# Patient Record
Sex: MALE | Race: WHITE | NOT HISPANIC OR LATINO | URBAN - METROPOLITAN AREA
[De-identification: names, ages, dates, MRNs, and addresses within clinical notes are randomized per-mention and may not be internally consistent; named-entity substitution may affect disease eponyms.]

---

## 2024-06-24 PROBLEM — F81.0 SPECIFIC LEARNING DISORDER WITH READING IMPAIRMENT: Status: ACTIVE | Noted: 2024-06-24

## 2024-06-24 PROBLEM — F43.21 PERSISTENT ADJUSTMENT DISORDER WITH DEPRESSED MOOD: Status: ACTIVE | Noted: 2024-06-24

## 2024-06-24 PROBLEM — L30.9 ECZEMA: Status: ACTIVE | Noted: 2024-06-24

## 2024-06-24 PROBLEM — L85.8 KERATOSIS PILARIS: Status: ACTIVE | Noted: 2024-06-24

## 2024-06-24 PROBLEM — F90.0 ADHD, PREDOMINANTLY INATTENTIVE TYPE: Status: ACTIVE | Noted: 2024-06-24

## 2024-06-24 PROBLEM — Z97.3 WEARS GLASSES: Status: ACTIVE | Noted: 2024-06-24

## 2024-06-24 PROBLEM — M94.0 COSTOCHONDRITIS: Status: RESOLVED | Noted: 2024-06-24 | Resolved: 2024-06-24

## 2024-06-24 PROBLEM — D22.9 BENIGN NEVUS: Status: ACTIVE | Noted: 2024-06-24

## 2024-06-25 ENCOUNTER — APPOINTMENT (OUTPATIENT)
Dept: PEDIATRICS | Facility: CLINIC | Age: 19
End: 2024-06-25
Payer: COMMERCIAL

## 2024-06-25 VITALS
SYSTOLIC BLOOD PRESSURE: 119 MMHG | BODY MASS INDEX: 31.72 KG/M2 | HEART RATE: 82 BPM | DIASTOLIC BLOOD PRESSURE: 81 MMHG | WEIGHT: 209.3 LBS | HEIGHT: 68 IN

## 2024-06-25 DIAGNOSIS — F90.0 ADHD, PREDOMINANTLY INATTENTIVE TYPE: ICD-10-CM

## 2024-06-25 DIAGNOSIS — F41.1 GENERALIZED ANXIETY DISORDER: ICD-10-CM

## 2024-06-25 DIAGNOSIS — Z00.01 ENCOUNTER FOR GENERAL ADULT MEDICAL EXAMINATION WITH ABNORMAL FINDINGS: Primary | ICD-10-CM

## 2024-06-25 PROBLEM — L30.9 ECZEMA: Status: RESOLVED | Noted: 2024-06-24 | Resolved: 2024-06-25

## 2024-06-25 PROCEDURE — 4004F PT TOBACCO SCREEN RCVD TLK: CPT | Performed by: PEDIATRICS

## 2024-06-25 PROCEDURE — 99395 PREV VISIT EST AGE 18-39: CPT | Performed by: PEDIATRICS

## 2024-06-25 PROCEDURE — 96127 BRIEF EMOTIONAL/BEHAV ASSMT: CPT | Performed by: PEDIATRICS

## 2024-06-25 PROCEDURE — 3008F BODY MASS INDEX DOCD: CPT | Performed by: PEDIATRICS

## 2024-06-25 RX ORDER — ESCITALOPRAM OXALATE 10 MG/1
TABLET ORAL
Qty: 30 TABLET | Refills: 0 | Status: SHIPPED | OUTPATIENT
Start: 2024-06-25

## 2024-06-25 ASSESSMENT — PATIENT HEALTH QUESTIONNAIRE - PHQ9
3. TROUBLE FALLING OR STAYING ASLEEP: NEARLY EVERY DAY
3. TROUBLE FALLING OR STAYING ASLEEP OR SLEEPING TOO MUCH: NEARLY EVERY DAY
SUM OF ALL RESPONSES TO PHQ QUESTIONS 1-9: 12
7. TROUBLE CONCENTRATING ON THINGS, SUCH AS READING THE NEWSPAPER OR WATCHING TELEVISION: NEARLY EVERY DAY
4. FEELING TIRED OR HAVING LITTLE ENERGY: SEVERAL DAYS
10. IF YOU CHECKED OFF ANY PROBLEMS, HOW DIFFICULT HAVE THESE PROBLEMS MADE IT FOR YOU TO DO YOUR WORK, TAKE CARE OF THINGS AT HOME, OR GET ALONG WITH OTHER PEOPLE: SOMEWHAT DIFFICULT
SUM OF ALL RESPONSES TO PHQ9 QUESTIONS 1 & 2: 0
9. THOUGHTS THAT YOU WOULD BE BETTER OFF DEAD, OR OF HURTING YOURSELF: NOT AT ALL
2. FEELING DOWN, DEPRESSED OR HOPELESS: NOT AT ALL
9. THOUGHTS THAT YOU WOULD BE BETTER OFF DEAD, OR OF HURTING YOURSELF: NOT AT ALL
1. LITTLE INTEREST OR PLEASURE IN DOING THINGS: NOT AT ALL
1. LITTLE INTEREST OR PLEASURE IN DOING THINGS: NOT AT ALL
7. TROUBLE CONCENTRATING ON THINGS, SUCH AS READING THE NEWSPAPER OR WATCHING TELEVISION: NEARLY EVERY DAY
8. MOVING OR SPEAKING SO SLOWLY THAT OTHER PEOPLE COULD HAVE NOTICED. OR THE OPPOSITE, BEING SO FIGETY OR RESTLESS THAT YOU HAVE BEEN MOVING AROUND A LOT MORE THAN USUAL: NEARLY EVERY DAY
6. FEELING BAD ABOUT YOURSELF - OR THAT YOU ARE A FAILURE OR HAVE LET YOURSELF OR YOUR FAMILY DOWN: SEVERAL DAYS
8. MOVING OR SPEAKING SO SLOWLY THAT OTHER PEOPLE COULD HAVE NOTICED. OR THE OPPOSITE - BEING SO FIDGETY OR RESTLESS THAT YOU HAVE BEEN MOVING AROUND A LOT MORE THAN USUAL: NEARLY EVERY DAY
2. FEELING DOWN, DEPRESSED OR HOPELESS: NOT AT ALL
5. POOR APPETITE OR OVEREATING: SEVERAL DAYS
5. POOR APPETITE OR OVEREATING: SEVERAL DAYS
10. IF YOU CHECKED OFF ANY PROBLEMS, HOW DIFFICULT HAVE THESE PROBLEMS MADE IT FOR YOU TO DO YOUR WORK, TAKE CARE OF THINGS AT HOME, OR GET ALONG WITH OTHER PEOPLE: SOMEWHAT DIFFICULT
4. FEELING TIRED OR HAVING LITTLE ENERGY: SEVERAL DAYS
6. FEELING BAD ABOUT YOURSELF - OR THAT YOU ARE A FAILURE OR HAVE LET YOURSELF OR YOUR FAMILY DOWN: SEVERAL DAYS

## 2024-06-25 ASSESSMENT — ANXIETY QUESTIONNAIRES
6. BECOMING EASILY ANNOYED OR IRRITABLE: NOT AT ALL
5. BEING SO RESTLESS THAT IT IS HARD TO SIT STILL: NEARLY EVERY DAY
GAD7 TOTAL SCORE: 18
3. WORRYING TOO MUCH ABOUT DIFFERENT THINGS: NEARLY EVERY DAY
IF YOU CHECKED OFF ANY PROBLEMS ON THIS QUESTIONNAIRE, HOW DIFFICULT HAVE THESE PROBLEMS MADE IT FOR YOU TO DO YOUR WORK, TAKE CARE OF THINGS AT HOME, OR GET ALONG WITH OTHER PEOPLE: EXTREMELY DIFFICULT
1. FEELING NERVOUS, ANXIOUS, OR ON EDGE: NEARLY EVERY DAY
7. FEELING AFRAID AS IF SOMETHING AWFUL MIGHT HAPPEN: NEARLY EVERY DAY
4. TROUBLE RELAXING: NEARLY EVERY DAY
2. NOT BEING ABLE TO STOP OR CONTROL WORRYING: NEARLY EVERY DAY

## 2024-06-25 NOTE — PROGRESS NOTES
"Connor Cates is here his annual well visit.  His last physcial with me was nearly 3 years ago.  He served in the  in the interim.    Questions or concerns:  He is concerned about his known anxiety and ADHD.  It affected his ability to stay in the .  He has been home since 5-6-2024.  He is presently working at [employer] and plans to take college classes.  He has been in therapy and continues to have access to his therapist.  He notes that he feels anxious \"all the time,\" feels full of energy then really tired, appetite is up and down, gets down on himself, has trouble concentrating, and tends to always be fidgety.  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  His girlfriend of 1.5 years encouraged him to come in for evaluation.    Nutrition, Elimination, and Sleep:  Nutrition:  generally well-balanced diet; he tries to eat healthy and would like to lose weight.  Elimination:  normal frequency and quality of stool  Sleep:  difficulty falling asleep as he has a lot going through his mind;  it is then often difficult to get out of bed; frequently he \"crashes in the afternoon\" when not working    Social:  Peer relations:  no concerns  Family relations:  no concerns  Job performance:  no concerns  Teen questionnaire:  reviewed  Activities:  running, working       [employer] 6/25/2024   MONA-7   Feeling nervous, anxious, or on edge 3   Not being able to stop or control worrying 3   Worrying too much about different things 3   Trouble relaxing 3   Being so restless that it is hard to sit still 3   Becoming easily annoyed or irritable 0   Feeling afraid as if something awful might happen 3   MONA-7 Total Score 18   PHQ9   Patient Health Questionnaire-9 Score 12   ASQ   1. In the past few weeks, have you wished you were dead? N   2. In the past few weeks, have you felt that you or your family would be better off if you were dead? N   3. In the past week, have you been having " "thoughts about killing yourself? N   4. Have you ever tried to kill yourself? N   Calculated Risk Score No intervention is necessary     Objective   /81   Pulse 82   Ht 1.731 m (5' 8.13\")   Wt 94.9 kg (209 lb 4.8 oz)   BMI 31.70 kg/m²   Physical Exam  Vitals reviewed.   Constitutional:       General: He is not in acute distress.     Appearance: Normal appearance. He is well-developed and overweight. He is not ill-appearing.   HENT:      Head: Normocephalic and atraumatic.      Right Ear: Tympanic membrane, ear canal and external ear normal.      Left Ear: Tympanic membrane, ear canal and external ear normal.      Nose: Nose normal.      Mouth/Throat:      Mouth: Mucous membranes are moist.      Pharynx: Oropharynx is clear.   Eyes:      Extraocular Movements: Extraocular movements intact.      Conjunctiva/sclera: Conjunctivae normal.      Pupils: Pupils are equal, round, and reactive to light.   Neck:      Thyroid: No thyroid mass or thyromegaly.   Cardiovascular:      Rate and Rhythm: Normal rate and regular rhythm.      Pulses: Normal pulses.      Heart sounds: Normal heart sounds.   Pulmonary:      Effort: Pulmonary effort is normal.      Breath sounds: Normal breath sounds.   Abdominal:      General: Bowel sounds are normal. There is no distension.      Palpations: Abdomen is soft. There is no hepatomegaly, splenomegaly or mass.      Tenderness: There is no abdominal tenderness.      Hernia: No hernia is present.   Genitourinary:     Penis: Normal and circumcised.       Testes: Normal.   Musculoskeletal:         General: No swelling, tenderness or deformity. Normal range of motion.      Cervical back: Normal range of motion and neck supple.   Skin:     General: Skin is warm and dry.      Findings: No lesion or rash.      Comments: Tattoos bilat ant thighs and R calf   Neurological:      General: No focal deficit present.      Mental Status: Mental status is at baseline.      Motor: No weakness.      " Gait: Gait normal.   Psychiatric:         Mood and Affect: Mood normal.      Comments: fidgety     Vision Screening - Comments:: WEAR GLASSES    Assessment/Plan   Problem List Items Addressed This Visit       ADHD, predominantly inattentive type    Relevant Orders    Follow Up In Pediatrics - Established Behavioral    Generalized anxiety disorder    Relevant Medications    escitalopram (Lexapro) 10 mg tablet    Other Relevant Orders    Follow Up In Pediatrics - Established Behavioral     Other Visit Diagnoses       Encounter for general adult medical examination with abnormal findings    -  Primary    Relevant Orders    Lipid Panel    C. Trachomatis / N. Gonorrhoeae, Amplified Detection    BMI 31.0-31.9,adult            Darryn is a generally healthy adult with severe anxiety as well as ADHD.  - Discussed diagnosis and possible treatments with Darryn, as well as benefits, risks, and proper use of medication.  I explained that therapy is the most important part of treatment, but we will also try medication to help decrease symptoms.  Significant side effects to this medication are not common, but if this dramatically worsens mood or if thoughts of hurting oneself develop, please let me know. If hyperactivity or over-excitement develops on this medication, please let me know. If it causes problems with movement or rash, please let me know. Follow-up in 1 month for a med check visit and as needed  - Guidance regarding safety, nutrition, physical activity, and sleep reviewed.  - Social:  questionnaire completed and reviewed.  Issues of smoking, vaping, snuf, substance use, sexuality, and mood discussed.    - Vaccines:  UTD - he will bring me a report of other vaccines that he received in the   - Return in 1 month for a med check and in 1 year for annual well exam or sooner if concerns arise

## 2024-06-25 NOTE — PATIENT INSTRUCTIONS
Discussed diagnosis and possible treatments, as well as benefits, risks, and proper use of medication.  Therapy is the most important part of treatment, but we will also try medication to help decrease symptoms.  Significant side effects to this medication are not common, but if this dramatically worsens mood or if thoughts of hurting oneself develop, please let me know. If hyperactivity or over-excitement develops on this medication, please let me know. If it causes problems with movement or rash, please let me know.     Please follow up in 4 weeks or sooner with questions or  concerns.

## 2024-07-17 ENCOUNTER — OFFICE VISIT (OUTPATIENT)
Dept: PEDIATRICS | Facility: CLINIC | Age: 19
End: 2024-07-17
Payer: COMMERCIAL

## 2024-07-17 VITALS
SYSTOLIC BLOOD PRESSURE: 126 MMHG | WEIGHT: 208.4 LBS | DIASTOLIC BLOOD PRESSURE: 80 MMHG | HEIGHT: 68 IN | BODY MASS INDEX: 31.58 KG/M2 | HEART RATE: 88 BPM

## 2024-07-17 DIAGNOSIS — F90.0 ADHD, PREDOMINANTLY INATTENTIVE TYPE: ICD-10-CM

## 2024-07-17 DIAGNOSIS — F41.1 GENERALIZED ANXIETY DISORDER: Primary | ICD-10-CM

## 2024-07-17 DIAGNOSIS — R94.120 FAILED HEARING SCREENING: ICD-10-CM

## 2024-07-17 DIAGNOSIS — H93.13 BILATERAL TINNITUS: ICD-10-CM

## 2024-07-17 PROCEDURE — 99214 OFFICE O/P EST MOD 30 MIN: CPT | Performed by: PEDIATRICS

## 2024-07-17 PROCEDURE — 3008F BODY MASS INDEX DOCD: CPT | Performed by: PEDIATRICS

## 2024-07-17 PROCEDURE — 4004F PT TOBACCO SCREEN RCVD TLK: CPT | Performed by: PEDIATRICS

## 2024-07-17 RX ORDER — ESCITALOPRAM OXALATE 10 MG/1
TABLET ORAL
Start: 2024-07-17

## 2024-07-17 RX ORDER — SERTRALINE HYDROCHLORIDE 25 MG/1
TABLET, FILM COATED ORAL
Qty: 82 TABLET | Refills: 0 | Status: SHIPPED | OUTPATIENT
Start: 2024-07-17

## 2024-07-17 NOTE — PATIENT INSTRUCTIONS
Zoloft (sertraline) 25 mg  1st week 1 pill each morning (=25 mg)  2nd week 2 pills each morning (=50 mg)  3rd week 3 pills each morning (=75 mg)  4th week 4 pills each morning (= 100 mg)    Lexapro (escitalopram) 10 mg  1st week one-half pill each morning (=5 mg) then discontinue

## 2024-07-17 NOTE — PROGRESS NOTES
"Subjective   Patient ID: Darryn Dee \"David\" is a 19 y.o. male who is here or follow-up of Anxiety.    HPI  David is here for follow-up of his anxiety earlier than we planned, because he is concerned about medication side effects.  He started escitalopram 3 weeks ago.  The first 6 days on 5 mg went fine; no side effects noted.  One week into taking the 10 mg dose he started feeling more irritable more easily.  His baseline symptoms of fidgety legs during the day and difficulty falling asleep at night remain (same - not worse).  Advil PM with some relief of difficulty falling asleep.    He also brought a copy of a failed hearing test on his L from his time in the .  He also notes some non-pulsatile ringing in his ears, which he mentioned at his last visit 3 weeks ago.     Objective   Blood pressure 126/80, pulse 88, height 1.721 m (5' 7.75\"), weight 94.5 kg (208 lb 6.4 oz).  Physical Exam  Constitutional:       Appearance: Normal appearance.   Pulmonary:      Effort: Pulmonary effort is normal.   Psychiatric:         Thought Content: Thought content normal.      Comments: Bouncing his legs the entire visit     OAE attempted, but we do not have a probe cover large enough for his ear canal.    Assessment/Plan   Problem List Items Addressed This Visit       ADHD, predominantly inattentive type    Generalized anxiety disorder - Primary     Will wean off of escitalopram secondary to increased irritability and start on sertraline, with follow-up in 4 weeks with me         Relevant Medications    sertraline (Zoloft) 25 mg tablet    escitalopram (Lexapro) 10 mg tablet     Other Visit Diagnoses       Failed hearing screening        Relevant Orders    Referral to Audiology    Referral to ENT    Bilateral tinnitus        Relevant Orders    Referral to ENT          "

## 2024-07-17 NOTE — ASSESSMENT & PLAN NOTE
Will wean off of escitalopram secondary to increased irritability and start on sertraline, with follow-up in 4 weeks with me

## 2024-07-23 DIAGNOSIS — F41.1 GENERALIZED ANXIETY DISORDER: ICD-10-CM

## 2024-07-23 RX ORDER — ESCITALOPRAM OXALATE 10 MG/1
TABLET ORAL
Qty: 30 TABLET | OUTPATIENT
Start: 2024-07-23

## 2024-07-26 ENCOUNTER — APPOINTMENT (OUTPATIENT)
Dept: PEDIATRICS | Facility: CLINIC | Age: 19
End: 2024-07-26
Payer: COMMERCIAL

## 2024-08-14 DIAGNOSIS — F41.1 GENERALIZED ANXIETY DISORDER: ICD-10-CM

## 2024-08-14 RX ORDER — SERTRALINE HYDROCHLORIDE 25 MG/1
TABLET, FILM COATED ORAL
Qty: 82 TABLET | Refills: 0 | OUTPATIENT
Start: 2024-08-14

## 2024-08-16 ENCOUNTER — APPOINTMENT (OUTPATIENT)
Dept: PEDIATRICS | Facility: CLINIC | Age: 19
End: 2024-08-16
Payer: COMMERCIAL

## 2024-08-20 DIAGNOSIS — F41.1 GENERALIZED ANXIETY DISORDER: ICD-10-CM

## 2024-08-20 RX ORDER — SERTRALINE HYDROCHLORIDE 100 MG/1
100 TABLET, FILM COATED ORAL DAILY
Qty: 30 TABLET | Refills: 4 | Status: SHIPPED | OUTPATIENT
Start: 2024-08-20 | End: 2025-01-17

## 2024-08-20 RX ORDER — SERTRALINE HYDROCHLORIDE 25 MG/1
TABLET, FILM COATED ORAL
Qty: 82 TABLET | Refills: 0 | Status: SHIPPED | OUTPATIENT
Start: 2024-08-20 | End: 2024-08-20 | Stop reason: DRUGHIGH